# Patient Record
Sex: MALE | Race: WHITE
[De-identification: names, ages, dates, MRNs, and addresses within clinical notes are randomized per-mention and may not be internally consistent; named-entity substitution may affect disease eponyms.]

---

## 2019-07-08 ENCOUNTER — HOSPITAL ENCOUNTER (EMERGENCY)
Dept: HOSPITAL 88 - FSED | Age: 59
Discharge: HOME | End: 2019-07-08
Payer: COMMERCIAL

## 2019-07-08 VITALS — WEIGHT: 277 LBS | BODY MASS INDEX: 34.44 KG/M2 | HEIGHT: 75 IN

## 2019-07-08 VITALS — DIASTOLIC BLOOD PRESSURE: 89 MMHG | SYSTOLIC BLOOD PRESSURE: 137 MMHG

## 2019-07-08 DIAGNOSIS — M54.2: Primary | ICD-10-CM

## 2019-07-08 DIAGNOSIS — S16.1XXA: ICD-10-CM

## 2019-07-08 DIAGNOSIS — S23.3XXA: ICD-10-CM

## 2019-07-08 DIAGNOSIS — I10: ICD-10-CM

## 2019-07-08 DIAGNOSIS — M54.6: ICD-10-CM
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
## 2019-07-08 PROCEDURE — 71046 X-RAY EXAM CHEST 2 VIEWS: CPT

## 2019-07-08 PROCEDURE — 72040 X-RAY EXAM NECK SPINE 2-3 VW: CPT

## 2019-07-08 PROCEDURE — 99283 EMERGENCY DEPT VISIT LOW MDM: CPT

## 2019-07-08 NOTE — XMS REPORT
Clinical Summary

                             Created on: 2019



Rubin Anderson

External Reference #: [id number]

: 1960

Sex: Male



Demographics







                          Address                   [address]

[place]  [address]

 

                          Home Phone                [phone]

 

                          Preferred Language        English

 

                          Marital Status            

 

                          Anabaptism Affiliation     Unknown

 

                          Race                      White

 

                          Ethnic Group              Non-





Author







                          Author                    [facility]

 

                          Organization              [facility]

 

                          Address                   Unknown

 

                          Phone                     Unavailable







Support







                Name            Relationship    Address         Phone

 

                    Bella Anderson       [employer]                [address]

[place]  [address]                     [phone]







Care Team Providers







                    Care Team Member Name    Role                Phone

 

                    Asked, No Pcp       PCP                 Unavailable







Allergies







                                        Comments



                 Active Allergy     Reactions       Severity        Noted Date 

 

                                         



                           Iodine                    2017 







Medications







                          End Date                  Status



              Medication     Sig          Dispensed     Refills      Start  



                                         Date  

 

                                                    Active



                     naproxen (NAPROSYN) 250     Take 250 mg         0   



                           MG tablet                 by mouth 2     



                                         (two) times a     



                                         day with     



                                         meals.     

 

                                                    Active



                     triamcinolone (NASACORT)     2 sprays into       0   



                           55 mcg nasal inhaler      each nostril     



                                         daily.     







Active Problems





No known active problems



Social History







                                        Date



                 Tobacco Use     Types           Packs/Day       Years Used 

 

                                         



                                         Never Smoker    









   



                 Alcohol Use     Drinks/Week     oz/Week         Comments

 

   



                                         No   









 



                           Sex Assigned at Birth     Date Recorded

 

 



                                         Not on file 









                                        Industry



                           Job Start Date            Occupation 

 

                                        Not on file



                           Not on file               Not on file 









                                        Travel End



                           Travel History            Travel Start 

 





                                         No recent travel history available.







Last Filed Vital Signs

Not on file



Plan of Treatment







   



                 Health Maintenance     Due Date        Last Done       Comments

 

   



                           COLONOSCOPY SCREENING     2011  

 

   



                           SHINGLES VACCINES (#1)     2011  

 

   



                           INFLUENZA VACCINE         2019  







Results

Not on fileafter 2018



Insurance







                                        Type



            Payer      Benefit     Subscriber ID     Effective     Phone      Address 



                           Plan /                    Dates   



                                         Group     

 

                                        TPL



                 TPL             TPL-MED-DA      xxxxxxxxxx      2017-   



                           TA                        Present   

 

                                        Exchange



                 for; to (do) Centers        xxxxxxxxxxxxx     2016-P   



                     EXCHANGE            [facility]IRUDH phan   



                                         [facility]     



                                         E     









     



            Guarantor Name     Account     Relation to     Date of     Phone      Billing Address



                     Type                Patient             Birth  

 

     



            Rubin Anderson     Personal/F     Self       1960     [phone]     [address]



                     amily               (Home)              [place] [address]

 

     



            Rubin Anderson     Third      Self       1960     [phone] [address]



                     Party               (Home)              [place] [address]



                                         Liability    







Advance Directives





Patient has advance care planning documents on file. For more information, nani banerjee contact:



Bienvenido Abbasi



[address] [facility] [place] [address]

## 2019-07-08 NOTE — DIAGNOSTIC IMAGING REPORT
EXAMINATION:  CXR 2 VIEW - HOPD    



INDICATION: Trauma



COMPARISON: None

     

FINDINGS:

TUBES and LINES:  None.



LUNGS:  The lung volumes are normal. No focal consolidation or pulmonary edema.



PLEURA:  No pleural effusion or pneumothorax. 



HEART AND MEDIASTINUM: The cardiomediastinal silhouette is normal in size and

contour. Retrocardiac lucency, consistent with hiatal hernia.



BONES AND SOFT TISSUES: No acute fracture or dislocation.



UPPER ABDOMEN: No free air under the diaphragm.



IMPRESSION: 

No focal consolidation or pulmonary edema. No acute fracture or dislocation.



Hiatal hernia.



Signed by: Gonzalo Mathis MD on 7/8/2019 4:10 PM

## 2019-07-08 NOTE — XMS REPORT
Patient Summary Document

                             Created on: 2019



RUBIN ROSA

External Reference #: [id number]

: 1960

Sex: Male



Demographics







                          Address                   [address] [place] [address]  [address]

 

                          Home Phone                [phone]

 

                          Preferred Language        Unknown

 

                          Marital Status            Unknown

 

                          Pentecostalism Affiliation     Unknown

 

                          Race                      Unknown

 

                          Ethnic Group              Unknown





Author







                          Author                    [facility]

 

                          [facility]

 

                          Address                   Unknown

 

                          Phone                     Unavailable







Care Team Providers







                    Care Team Member Name    Role                Phone

 

                    VIKTORIYA CRAMER    Unavailable         Unavailable







Problems

This patient has no known problems.



Allergies, Adverse Reactions, Alerts

This patient has no known allergies or adverse reactions.



Medications

This patient has no known medications.



Results







           Test Description    Test Time    Test Comments    Text Results    Atomic Results    Result

 Comments

 

                    CT, ABDOMEN         2018 21:41:00    Reason for exam:->abdominal painWhat is the patient's

 sedation requirement?->No Sedation     FINAL REPORT PATIENT ID:   [id number] CT of the

 abdomen and pelvis History: Abdominal pain Comparison: None available.         
                                                             Technique: [facility]
idetector CT scanning of the abdomen and pelvis was performed from the level of 
the lung bases to the inferior pubic ramus without contrast. DOSE REDUCTION: The
examination was performed according to departmental dose-optimization program 
which includes automated exposure control, adjustment of the mA and/or kV 
according to patient size and/or use of iterative reconstruction technique.     
                                                            Discussion: The 
bilateral lung bases are clear. Lack of IV contrast limits evaluation of solid 
and hollow visceral organs. No focal hepatic lesions are identified. The 
gallbladder is surgically absent. There is no intrahepatic or extrahepatic 
biliary dilatation. The spleen is within normal limits of size. The right 
adrenal gland is normal. The left adrenal gland contains a left adrenal gland 
contains a 2.1 cm myelolipoma. The pancreas is within normal limits. The kidneys
are normal in size. There is no hydroureteronephrosis. No obstructing renal calc
tanika are identified. The stomach demonstrates postsurgical changes consistent 
with a Nissen fundoplication. The stomach, small, and large bowel are 
nondistended. There is no evidence of obstruction. No bowel wall thickening is 
appreciated. The appendix is normal. There are scattered colonic diverticula 
without evidence of acute diverticulitis. There is no free retroperitoneal air 
or ascites. The abdominal aorta is of normal course and caliber. No enlarged 
abdominal or retroperitoneal lymph nodes are identified. The urinary bladder is 
within normal limits. No acute osseous abnormalities are identified. 
IMPRESSION:1. Colonic diverticulosis without evidence of acute diverticulitis.2.
Status post cholecystectomy and Nissen fundoplication.3. Left adrenal 
myelolipoma. Signed: Dreyer, Stephen MDReport Verified Date/Time:  2018 
21:41:46 Reading Location: [facility] B1 C013W Consult Reading Room     Electronically 
signed by: STEPHEN EDWARD DREYER on 2018 09:41 PM      

 

                    PET, CARDIAC PERFUSION MULTIPLE STUDIES, REST AND STRESS    2018 21:20:00    Reason

 for exam:->Chest pain                  FINAL REPORT PATIENT ID:   [id number] PROCEDURE:     Rest/Stress

 MYOCARDIAL PERFUSION PET with regadenoson\XA9\ CPT CODE:     71475 INDICATION: 
   Chest pain HISTORY:     Cardiac risk factors: hypertension, obesity, family 
hx of early CAD. Other cardiovascular history: None reported. Recent cardiac 
symptoms: chest pain, dyspnea. Current cardiovascular-related medications: None 
listed. PROTOCOL:     Limited low-dose CT imaging was performed for attenuation 
correction. 40.1 mCi of Rb-82 chloride was injected iv at rest, and gated PET 
(positron emission tomography) images were obtained. Subsequently, 40.0 mCi of 
Rb-82 chloride was injected iv at expected peak pharmacologic effect, and gated 
PET images were obtained.  PRELIMINARY STRESS TEST DATA FROM NONINVASIVE 
CARDIOLOGY:     Pharmacologic stress was by 10-second iv infusion of 0.4 mg of 
regadenoson. Radiotracer was injected 30 seconds after start of stress. Heart 
rate was 66 beats/min at rest and 68 beats/min (41% of MPHR) at tracer 
injection. BP was 116/69 mmHg at rest and 129/63 mmHg at tracer injection. 
Stress was stopped for predetermined endpoint. The patient experienced dyspnea; 
treatment was not required. Preliminary ECG evaluation revealed sinus rhythm at 
rest and no ischemic changes with stress. (Final ECG interpretation and other 
stress and monitoring data are reported separately by Cardiology.)  IMAGING 
FINDINGS:     Study quality is good. Images obtained after rest and stress 
injections show normal LV activity. LV and RV volumes appear normal. Gated 
images obtained at rest and with stress show normal LV wall motion and thickenin
g. LVEF at rest is 57%. LVEF at stress is 72%. IMPRESSION:     1. Normal study. 
2. Appropriate pharmacologic stress.  3. Normal myocardial perfusion.  4. Normal
resting LV function. No deterioration of function is noted with pharmacologic 
stress.  5. Normal extracardiac tracer distribution.  6. No previous [facility] study
for comparison.   NONINVASIVE RISK STRATIFICATION: The above findings are 
considered low risk (<1% annual mortality rate) based on the following 
criterion:- Normal or small myocardial perfusion defect at rest or with 
stress(JACC. 2012;59(9):684-81.) Signed: Rubin Monzon Verified 
Date/Time:  2018 21:20:37 Reading Location: [address] 
Reading Room    Electronically signed by: RUBIN MONZON MD on 2018 
09:20 PM                                 

 

                POCT-GLUCOSE METER    2018 07:38:00                      

 

   

 

                POC-GLUCOSE METER (BEAKER) (test code=1538)    91 mg/dL                  TESTED AT [facility] [address]

 [facility] [address]





TROPONIN -43-10 23:53:00* 





                Test Item       Value           Reference Range    Comments

 

                TROPONIN I (BEAKER) (test code=397)    < ng/mL         0.00-0.03        





Troponin I (TnI) levels must be interpreted in the context of the presenting sym
ptoms and the clinical findings. Elevated TnI levels indicate myocardial damage,
but are not specific for ischemic heart disease. Elevated TnI levels are seen in
patients with other cardiac conditions (including myocarditis and congestive h
eart failure), and slight TnI elevations occur in patients with other conditions
, including sepsis, renal failure, acidosis, acute neurological disease, and per
sistent tachyarrhythmia.[id number] 23:46:00* 





                Test Item       Value           Reference Range    Comments

 

                LIPASE (BEAKER) (test code=749)    39 U/L          8-78             





[id number] 23:46:00* 





                Test Item       Value           Reference Range    Comments

 

                AMYLASE (BEAKER) (test code=349)    41 U/L                     





RAD, ABDOMEN,  2 [id number] 23:12:00Reason for exam:->abdominal painFINAL 
REPORT PATIENT ID:   [id number] Abdominal series, upright and supine views C
LINICAL HISTORY: Abdominal pain COMPARISON: None FINDINGS:The bowel gas pattern 
is nonobstructive. No free intraperitoneal air or air-fluid levels are identifie
d. The regional osseous skeleton is intact. IMPRESSION:Nonobstructive bowel gas 
pattern. Signed: Dreyer, Stephen MDReport Verified Date/Time:  2018 23:12:
35 Reading Location: [facility] B1 C013W Consult Reading Room     Electronically signed
by: STEPHEN EDWARD DREYER on 2018 11:12 PM POCT-GLUCOSE [id number] 
21:52:00* 





                Test Item       Value           Reference Range    Comments

 

                POC-GLUCOSE METER (BEAKER) (test code=1538)    96 mg/dL                  TESTED AT [facility] [address]

 [facility] [address]





RAD, CHEST, 2 [id number] 16:37:00Reason for exam:->CHEST PAINFINAL REPORT 
PATIENT ID:   [id number]  TECHNIQUE: Frontal and lateral chest radiographs dated 
2018. CLINICAL HISTORY: Chest pain COMPARISON STUDY: None FINDINGS:  
Atelectasis is seen in the left lung base. No focal consolidation. No pleural 
effusion or pneumothorax. Cardiomediastinal silhouette is normal in size. Small 
hiatal hernia is present. No pulmonary edema. Degenerative changes are seen in 
the spine. No fracture. IMPRESSION: No focal consolidation. Signed: Yessi Colbert MDReport Verified Date/Time:  2018 16:37:25 Reading Location: [facility] Reading Room    Electronically signed by: YESSI COLBERT 
on 2018 04:37 PM [id number] 16:31:00* 





                Test Item       Value           Reference Range    Comments

 

                D-DIMER QUANTITATIVE (BEAKER) (test code=671)    0.24 MG/L FEU    <0.50            





REGARDING D-DIMER RESULTS: Results of this D-Dimer test should always be interpr
eted in conjunction with the patient's medical history, clinical presentation an
d other findings. DVT clinical diagnosis should not be based on the results of I
NNOVANCE D-Dimer alone.PROTHROMBIN TIME/[id number] 16:26:00* 





                Test Item       Value           Reference Range    Comments

 

                PROTIME (BEAKER) (test code=759)    9.8 seconds     9.8-12.0         

 

                INR (BEAKER) (test code=370)    0.9             <=5.9            





RECOMMENDED COUMADIN/WARFARIN INR THERAPY RANGESSTANDARD DOSE: 2.0 - 3.0   Inclu
diego: PROPHYLAXIS for venous thrombosis, systemic embolization; TREATMENT for chilo
ous thrombosis and/or pulmonary embolus.HIGH RISK: Target INR is 2.5-3.5 for pat
ients with mechanical heart valves.RAPID [id number] 16:25:00* 





                Test Item       Value           Reference Range    Comments

 

                RAPID MYOGLOBIN (BEAKER) (test code=2237)    56 ng/mL        <107             





RAPID [id number] 16:25:00* 





                Test Item       Value           Reference Range    Comments

 

                RAPID CKMB (BEAKER) (test code=1482)    < ng/mL         0.0-4.3          





B-TYPE NATRIURETIC FACTOR (BNP)2018 16:25:00* 





                Test Item       Value           Reference Range    Comments

 

                B-TYPE NATRIURETIC PEPTIDE (BEAKER) (test code=700)    8 pg/mL         0-100            





RAPID TROPONIN -88-53 16:24:00* 





                Test Item       Value           Reference Range    Comments

 

                RAPID TROPONIN I (BEAKER) (test code=1483)    < ng/mL         <0.05            





[id number] 16:23:00* 





                Test Item       Value           Reference Range    Comments

 

                MAGNESIUM (BEAKER) (test code=627)    2.1 mg/dL       1.5-3.0          





COMPREHENSIVE METABOLIC [id number] 16:23:00* 





                Test Item       Value           Reference Range    Comments

 

                TOTAL PROTEIN (BEAKER) (test code=770)    6.9 gm/dL       6.0-8.5          

 

                ALBUMIN (BEAKER) (test code=1145)    4.2 g/dL        3.5-5.0          

 

                ALKALINE PHOSPHATASE (BEAKER) (test code=346)    80 U/L                     

 

                BILIRUBIN TOTAL (BEAKER) (test code=377)    0.5 mg/dL       0.1-1.2          

 

                SODIUM (BEAKER) (test code=381)    144 meq/L       135-148          

 

                POTASSIUM (BEAKER) (test code=379)    3.9 meq/L       3.6-5.5          

 

                CHLORIDE (BEAKER) (test code=382)    107 meq/L                  

 

                CO2 (BEAKER) (test code=355)    24 meq/L        24-32            

 

                BLOOD UREA NITROGEN (BEAKER) (test code=354)    15 mg/dL        10-26            

 

                CREATININE (BEAKER) (test code=358)    0.97 mg/dL      0.50-1.20        

 

                GLUCOSE RANDOM (BEAKER) (test code=652)    85 mg/dL                   

 

                CALCIUM (BEAKER) (test code=697)    8.7 mg/dL       8.5-10.5         

 

                AST (SGOT) (BEAKER) (test code=353)    24 U/L          5-40             

 

                ALT (SGPT) (BEAKER) (test code=347)    30 U/L          5-50             

 

                EGFR (BEAKER) (test code=1092)    80 mL/min/1.73 sq m                    ESTIMATED GFR IS NOT AS 

ACCURATE AS CREATININE CLEARANCE IN PREDICTING GLOMERULAR FILTRATION RATE. 
ESTIMATED GFR IS NOT APPLICABLE FOR DIALYSIS PATIENTS.





CREATINE KINASE (CK)2018 16:23:00* 





                Test Item       Value           Reference Range    Comments

 

                CREATINE KINASE TOTAL (BEAKER) (test code=380)    28 U/L                     





CBC W/PLT COUNT & AUTO [id number] 16:11:00* 





                Test Item       Value           Reference Range    Comments

 

                WHITE BLOOD CELL COUNT (BEAKER) (test code=775)    6.2 10e3/i? L    4.0-10.0         

 

                RED BLOOD CELL COUNT (BEAKER) (test code=761)    4.69 10e6/i? L    4.20-5.80        

 

                HEMOGLOBIN (BEAKER) (test code=410)    14.1 g/dL       13.0-16.8        

 

                HEMATOCRIT (BEAKER) (test code=411)    41.8 %          40.0-50.0        

 

                MEAN CORPUSCULAR VOLUME (BEAKER) (test code=753)    89.2 fL         82.0-98.0        

 

                MEAN CORPUSCULAR HEMOGLOBIN (BEAKER) (test code=751)    30.1 pg         27.0-33.0        

 

                    MEAN CORPUSCULAR HEMOGLOBIN CONC (BEAKER) (test code=752)    33.7 g/dL           32.0-36.0 

                                         

 

                RED CELL DISTRIBUTION WIDTH (BEAKER) (test code=412)    12.8 %          10.3-14.2        

 

                PLATELET COUNT (BEAKER) (test code=756)    194 10e3/i? L    150-430          

 

                MEAN PLATELET VOLUME (BEAKER) (test code=754)    7.2 fL          6.5-10.5         

 

                NEUTROPHILS RELATIVE PERCENT (BEAKER) (test code=429)    52 %                             

 

                LYMPHOCYTES RELATIVE PERCENT (BEAKER) (test code=430)    34 %                             

 

                MONOCYTES RELATIVE PERCENT (BEAKER) (test code=431)    10 %                             

 

                EOSINOPHILS RELATIVE PERCENT (BEAKER) (test code=432)    3 %                              

 

                BASOPHILS RELATIVE PERCENT (BEAKER) (test code=437)    1 %                              

 

                NEUTROPHILS ABSOLUTE COUNT (BEAKER) (test code=670)    3.23 10e3/i? L    1.80-8.00       

 

 

                LYMPHOCYTES ABSOLUTE COUNT (BEAKER) (test code=414)    2.08 10e3/i? L    1.48-4.50       

 

 

                MONOCYTES ABSOLUTE COUNT (BEAKER) (test code=415)    0.59 10e3/i? L    0.00-1.30        

 

                EOSINOPHILS ABSOLUTE COUNT (BEAKER) (test code=416)    0.20 10e3/i? L    0.00-0.50       

 

 

                BASOPHILS ABSOLUTE COUNT (BEAKER) (test code=417)    0.06 10e3/i? L    0.00-0.20

## 2019-07-08 NOTE — DIAGNOSTIC IMAGING REPORT
EXAM: Cervical spine, 4 views



DATE: 7/8/2019

INDICATION: Trauma

COMPARISON: None



FINDINGS:



No acute fracture. The alignment is within normal limits. Mild multilevel

degenerative changes of the cervical spine. The soft tissues appear

unremarkable.



IMPRESSION:

No acute osseous injury of the cervical spine.



Signed by: Gonzalo Mathis MD on 7/8/2019 4:14 PM

## 2019-07-08 NOTE — XMS REPORT
Clinical Summary

                             Created on: 2019



Rubin Anderson

External Reference #: [id number]

: 1960

Sex: Male



Demographics







                          Address                   [id number] [place] [address]  [address]

 

                          Home Phone                [phone]

 

                          Preferred Language        English

 

                          Marital Status            Unknown

 

                          Confucianist Affiliation     Pentecostalism

 

                          Race                      White

 

                          Ethnic Group              Non-





Author







                          Author                    [facility]St[facility]

 

                          Organization              [facility]

 

                          Address                   Unknown

 

                          Phone                     Unavailable







Support







                Name            Relationship    Address         Phone

 

                Bella Anderson    [employer]            Unknown         [phone]







Care Team Providers







                    Care Team Member Name    Role                Phone

 

                    Sharpless           PCP                 [phone]







Allergies







                                        Comments



                 Active Allergy     Reactions       Severity        Noted Date 

 

                                         



                     Iodinated Contrast- Oral     Swelling            2017 



                                         And Iv Dye    







Medications







                          End Date                  Status



              Medication     Sig          Dispensed     Refills      Start  



                                         Date  

 

                                                    Active



                     albuterol HFA (VENTOLIN     Inhale 2            0   



                           HFA) 90 mcg/actuation     puffs by     



                           inhaler                   mouth via     



                                         inhaler every     



                                         6 (six) hours     



                                         as needed for     



                                         Wheezing or     



                                         Shortness of     



                                         Breath.     

 

                                                    Active



                     amitriptyline (ELAVIL) 10     Take 10 mg by       0   



                           MG tablet                 mouth     



                                         nightly.     

 

                                                    Active



                     fluticasone (FLONASE) 50     2 sprays by         0   



                           mcg/actuation nasal spray     Nasal route     



                                         daily.     

 

                                                    Active



                     fluticasone-salmeterol     Inhale 1 puff       0   



                           (ADVAIR) 250-50 mcg/dose     by mouth via     



                           diskus inhaler            inhaler every     



                                         12 (twelve)     



                                         hours.     

 

                                                    Active



                     gabapentin (NEURONTIN)     Take 300 mg         0   



                           300 MG capsule            by mouth 3     



                                         (three) times     



                                         daily.     

 

                                                    Active



                     lisinopril          Take 10 mg by       0   



                           (PRINIVIL,ZESTRIL) 10 MG     mouth daily.     



                                         tablet      

 

                                                    Active



                     montelukast (SINGULAIR)     Take 10 mg by       0   



                           10 mg tablet              mouth     



                                         nightly.     

 

                                                    Active



                     omeprazole (PRILOSEC) 40     Take 40 mg by       0   



                           MG capsule                mouth 2 (two)     



                                         times daily.     

 

                                                    Active



                     ranitidine (ZANTAC) 300     Take 300 mg         0   



                           MG tablet                 by mouth     



                                         nightly.     

 

                                                    Active



                     traMADol (ULTRAM) 50 mg     Take 50 mg by       0   



                           tablet                    mouth every 6     



                                         (six) hours     



                                         as needed for     



                                         Pain.     

 

                          2018                Discontinued



                     lisinopril          Take 10 mg by       0   



                           (PRINIVIL,ZESTRIL) 10 MG     mouth daily.     



                                         tablet      

 

                          2018                Discontinued



                     naproxen (NAPROSYN) 500     Take 500 mg         0   



                           MG tablet                 by mouth as     



                                         needed.     

 

                          2018                Discontinued



                     cetirizine (ZYRTEC) 5 MG     Take 5 mg by        0   



                           tablet                    mouth daily.     

 

                          2018                Discontinued



                 amitriptyline (ELAVIL) 10     TAKE ONE        0                 



                     MG tablet           TABLET BY           8  



                                         MOUTH AT     



                                         BEDTIME     

 

                          2018                Discontinued



                 gabapentin (NEURONTIN)     TAKE 2          0                 



                     300 MG capsule      CAPSULES BY         8  



                                         MOUTH AT NOON     

 

                          2018                Discontinued



                     triamcinolone (NASACORT)     Nasacort            0   



                                         55 mcg nasal inhaler      

 

                          2018                Discontinued



                 fluticasone (FLONASE) 50     2 sprays.       0                 



                           mcg/actuation nasal spray        8  

 

                          2018                Discontinued



                 albuterol HFA (VENTOLIN     Inhale 2        0                 



                     HFA) 90 mcg/actuation     puffs by            8  



                           inhaler                   mouth via     



                                         inhaler every     



                                         6 (six) hours     



                                         as needed for     



                                         Wheezing or     



                                         Shortness of     



                                         Breath .     

 

                          2018                Discontinued



                     ranitidine (ZANTAC) 300     Take 300 mg         0   



                           MG tablet                 by mouth     



                                         nightly.     

 

                          2018                Discontinued



                     omeprazole (PRILOSEC) 40     Take 40 mg by       0   



                           MG capsule                mouth 2 (two)     



                                         times daily.     







Active Problems







 



                           Problem                   Noted Date

 

 



                           Acute chest pain          2018

 

 



                           Gastroesophageal reflux disease without esophagitis     2018

 

 



                           History of hiatal hernia     2018

 

 



                           Essential hypertension     2018

 

 



                           WAI on CPAP               2018

 

 



                           Obesity (BMI 30.0-34.9)     2018

 

 



                           Moderate intermittent asthma without complication     2018

 

 



                           Right sided abdominal pain     2018

 

 



                           History of cholecystectomy     2018

 

 



                           History of Nissen fundoplication 2014     2018

 

 



                           Chronic back pain greater than 3 months duration     2018







Resolved Problems







  



                     Problem             Noted Date          Resolved Date

 

  



                     Chest pain in adult     2018

 

  



                     Shortness of breath     2018

 

  



                     Dizziness           2018

 

  



                     Nausea              2018

 

  



                     Inguinal hernia     10/04/2017          2018







Encounters







                          Care Team                 Description



                     Date                Type                Specialty  

 

                                        



Tiara Hurley MD



MerlyIam randle MD                     Chest pain in adult (Primary Dx); 

Nausea; 

Shortness of breath; 

Dizziness



                     2018          Emergency           Cardiology  



                                         -    



                                         2018          Emergency           Emergency Medicine  

 

                                                     



                     2018          Orders Only         General Internal Medicine  



after 2018



Immunizations







  



                     Name                Dates Previously Given     Next Due

 

  



                           Tdap                      2011 







Family History







   



                 Medical History     Relation        Name            Comments

 

   



                           Heart disease             Father  

 

   



                           Heart disease             Maternal  



                                         Grandfather  

 

   



                           Cancer                    Maternal  



                                         Grandmother  

 

   



                           Heart disease             Mother  

 

   



                           Cancer                    Sister  









   



                 Relation        Name            Status          Comments

 

   



                                         Father   

 

   



                                         Maternal Grandfather   

 

   



                                         Maternal Grandmother   

 

   



                                         Mother   

 

   



                                         Sister   







Social History







                                        Date



                 Tobacco Use     Types           Packs/Day       Years Used 

 

                                         



                                         Never Smoker    

 

    



                                         Smokeless Tobacco: Never   



                                         Used   









   



                 Alcohol Use     Drinks/Week     oz/Week         Comments

 

   



                                         No   









 



                           Sex Assigned at Birth     Date Recorded

 

 



                                         Not on file 









                                        Industry



                           Job Start Date            Occupation 

 

                                        Not on file



                           Not on file               Not on file 









                                        Travel End



                           Travel History            Travel Start 

 





                                         No recent travel history available.







Last Filed Vital Signs







                                        Time Taken



                           Vital Sign                Reading 

 

                                        2018  5:51 AM CDT



                           Blood Pressure            120/67 

 

                                        2018  9:16 AM CDT



                           Pulse                     69 

 

                                        2018  5:51 AM CDT



                           Temperature               36.5 C (97.7 F) 

 

                                        2018  9:16 AM CDT



                           Respiratory Rate          18 

 

                                        2018  9:16 AM CDT



                           Oxygen Saturation         99% 

 

                                        -



                           Inhaled Oxygen            - 



                                         Concentration  

 

                                        2018  7:15 AM CDT



                           Weight                    118.5 kg (261 lb 4.8 oz) 

 

                                        -



                           Height                    - 

 

                                        2018  7:15 AM CDT



                           Body Mass Index           32.66 







Plan of Treatment





Not on file



Implants







                    Device Identifier    Shelf Expiration Date    Model / Serial / Lot



                 Implanted       Type            Area            Manufactur   



                                         er   

 

                                        10/28/2021          [id number] /

 /

[id number]



                 Mesh Kvng Pre-Shp 4.5x10cm [id number]     Mesh            Right: Inguinal     CR   



                           - [id number]               BARD:DAVOL   



                                         Implanted: Qty: 1 on 10/04/2017 by      



                                         Raheem Harper MD      







Procedures







                                        Comments



                 Procedure Name     Priority        Date/Time       Associated Diagnosis 

 

                                         



                           RHYTHM STRIP - SCAN       2018  



                                         7:51 AM CDT  

 

                                        



Results for this procedure are in the results section.



                     ED ECG INTERPRETATION     Routine             2018  



                                         3:39 PM CDT  

 

                                        



Results for this procedure are in the results section.



                     CT ABDOMEN/PELVIS WITHOUT     STAT                2018  



                           IV CONTRAST               9:30 PM CDT  

 

                                        



Results for this procedure are in the results section.



                     NM CARDIAC PET PERFUSION     Routine             2018  



                           REST AND/OR STRESS        4:23 PM CDT  

 

                                        



Results for this procedure are in the results section.



                     TREADMILL           Routine             2018  



                           TOLERANCE(NON-NUCLEAR      4:04 PM CDT  



                                         TREADMILL)    

 

                                         



                           RHYTHM STRIP - SCAN       2018  



                                         10:52 AM CDT  

 

                                        



Results for this procedure are in the results section.



                     POCT-GLUCOSE METER     Routine             2018  



                                         7:07 AM CDT  

 

                                        



Results for this procedure are in the results section.



                     AMYLASE             ASAP                2018  



                                         11:20 PM CDT  

 

                                        



Results for this procedure are in the results section.



                     LIPASE              ASAP                2018  



                                         11:20 PM CDT  

 

                                        



Results for this procedure are in the results section.



                     TROPONIN I          ASAP                2018  



                                         11:20 PM CDT  

 

                                        



Results for this procedure are in the results section.



                     XR ABDOMEN 2 VIEWS FLAT     STAT                2018  



                           AND UPRIGHT               11:08 PM CDT  

 

                                        



Results for this procedure are in the results section.



                     POCT-GLUCOSE METER     Routine             2018  



                                         9:51 PM CDT  

 

                                        



Results for this procedure are in the results section.



                     XR CHEST 2 VIEWS     STAT                2018  



                                         4:27 PM CDT  

 

                                        



Results for this procedure are in the results section.



                     D-DIMER             STAT                2018  



                                         4:08 PM CDT  

 

                                        



Results for this procedure are in the results section.



                     RAPID MYOGLOBIN     STAT                2018  



                                         4:00 PM CDT  

 

                                        



Results for this procedure are in the results section.



                     CBC W/PLT COUNT & AUTO     STAT                2018  



                           DIFFERENTIAL              3:59 PM CDT  

 

                                        



Results for this procedure are in the results section.



                     B-TYPE NATRIURETIC FACTOR     STAT                2018  



                           (BNP)                     3:59 PM CDT  

 

                                        



Results for this procedure are in the results section.



                     CREATINE KINASE (CK)     STAT                2018  



                                         3:59 PM CDT  

 

                                        



Results for this procedure are in the results section.



                     RAPID TROPONIN I     STAT                2018  



                                         3:59 PM CDT  

 

                                        



Results for this procedure are in the results section.



                     RAPID CK-MB         STAT                2018  



                                         3:59 PM CDT  

 

                                        



Results for this procedure are in the results section.



                     PROTHROMBIN TIME/INR     STAT                2018  



                                         3:59 PM CDT  

 

                                        



Results for this procedure are in the results section.



                     MAGNESIUM           STAT                2018  



                                         3:59 PM CDT  

 

                                        



Results for this procedure are in the results section.



                     COMPREHENSIVE METABOLIC     STAT                2018  



                           PANEL                     3:59 PM CDT  

 

                                        



Results for this procedure are in the results section.



                     CBC W/PLT COUNT & AUTO     STAT                2018  



                           DIFFERENTIAL              3:59 PM CDT  

 

                                        



Results for this procedure are in the results section.



                     ECG 12-LEAD         STAT                2018  



                                         3:48 PM CDT  



after 2018



Results

* RHYTHM STRIP - SCAN (2018  7:51 AM CDT)



Only the most recent of 2 results within the time period is included.





 



                           Narrative                 Performed At

 

 



                                         This result has an attachment that is not available. 





* ED ECG Interpretation (2018  3:39 PM CDT)





 



                           Narrative                 Performed At

 

 



                                         Tiara Hurley MD 20183:39 PM 



                                         ECG/EKG Interpretation 



                                         Date/Time: 2018 3:48 PM 



                                         Performed by: TIARA HURLEY 



                                         Authorized by: TIARA HURLEY 



                                         The ECG was interpreted by ED physician. This ECG was not compared with 



                                         previous ECG(s).The ECG is interpreted as sinus rhythm. Rate is normal 



                                         rate. Heart rate is 83 BPM. 



                                         Conduction: conduction normal. T-wave flattening in lead(s) III and aVF. 



                                         Other findings: no other findings. Right sided lead use: right-sided leads 



                                         not used. 



                                         Left sided lead use: Posterior leads were not used. Clinical Impression: 



                                         non-specific ECGECG reviewed and does not meet STEMI criteria. Patient 



                                         tolerance: Patient tolerated the procedure well with no immediate 



                                         complications 





* CT abdomen/pelvis without iv contrast (2018  9:30 PM CDT)









                                         Specimen

 











 



                           Narrative                 Performed At

 

 



                           FINAL REPORT              [facility]



                                         PATIENT ID: [id number] 



                                         CT of the abdomen and pelvis 



                                         History: Abdominal pain 



                                         Comparison: None available. 



                                         

 



                                          



                                         Technique: Multidetector CT scanning of the abdomen and pelvis was 



                                         performed from the level of the lung bases to the inferior pubic 



                                         ramus without contrast. 



                                         DOSE REDUCTION: The examination was performed according to 



                                         departmental dose-optimization program which includes automated 



                                         exposure control, adjustment of the mA and/or kV according to patient 



                                         size and/or use of iterative reconstruction 



                                         technique.

 



                                          



                                         Discussion: 



                                         The bilateral lung bases are clear. 



                                         Lack of IV contrast limits evaluation of solid and hollow visceral 



                                         organs. 



                                         No focal hepatic lesions are identified. The gallbladder is 



                                         surgically absent. There is no intrahepatic or extrahepatic biliary 



                                         dilatation. 



                                         The spleen is within normal limits of size. 



                                         The right adrenal gland is normal. The left adrenal gland contains a 



                                         left adrenal gland contains a 2.1 cm myelolipoma. 



                                         The pancreas is within normal limits. 



                                         The kidneys are normal in size. There is no hydroureteronephrosis. 



                                         No obstructing renal calculi are identified. 



                                         The stomach demonstrates postsurgical changes consistent with a 



                                         Nissen fundoplication. The stomach, small, and large bowel are 



                                         nondistended. There is no evidence of obstruction. No bowel wall 



                                         thickening is appreciated. The appendix is normal. There are 



                                         scattered colonic diverticula without evidence of acute 



                                         diverticulitis. 



                                         There is no free retroperitoneal air or ascites. 



                                         The abdominal aorta is of normal course and caliber. 



                                         No enlarged abdominal or retroperitoneal lymph nodes are identified. 



                                         The urinary bladder is within normal limits. 



                                         No acute osseous abnormalities are identified. 



                                         IMPRESSION: 



                                         1. Colonic diverticulosis without evidence of acute diverticulitis. 



                                         2. Status post cholecystectomy and Nissen fundoplication. 



                                         3. Left adrenal myelolipoma. 



                                         Signed: Dreyer, Stephen MD 



                                         Report Verified Date/Time:2018 21:41:46 



                                         Reading Location: [facility] C013W Consult Reading Room 



                                          Electronically signed by: STEPHEN EDWARD DREYER on 2018 09:41 PM

 









                                        Procedure Note

 

                                        



Interface, External Ris In - 2018  9:43 PM CDT



FINAL REPORT

 

PATIENT ID:   [id number]

 

CT of the abdomen and pelvis

 

History: Abdominal pain

 

Comparison: None available.

                                                                       



Technique: Multidetector CT scanning of the abdomen and pelvis was 

performed from the level of the lung bases to the inferior pubic 

ramus without contrast.

 

DOSE REDUCTION: The examination was performed according to 

departmental dose-optimization program which includes automated 

exposure control, adjustment of the mA and/or kV according to patient 

size and/or use of iterative reconstruction technique.                          
                                     



 

 

Discussion: 

The bilateral lung bases are clear.

 

Lack of IV contrast limits evaluation of solid and hollow visceral 

organs.

 

No focal hepatic lesions are identified. The gallbladder is 

surgically absent. There is no intrahepatic or extrahepatic biliary 

dilatation.

 

The spleen is within normal limits of size.

 

The right adrenal gland is normal. The left adrenal gland contains a 

left adrenal gland contains a 2.1 cm myelolipoma.

 

The pancreas is within normal limits.

 

The kidneys are normal in size. There is no hydroureteronephrosis.

 

No obstructing renal calculi are identified.

 

The stomach demonstrates postsurgical changes consistent with a 

Nissen fundoplication. The stomach, small, and large bowel are 

nondistended. There is no evidence of obstruction. No bowel wall 

thickening is appreciated. The appendix is normal. There are 

scattered colonic diverticula without evidence of acute 

diverticulitis.

 

There is no free retroperitoneal air or ascites.

 

The abdominal aorta is of normal course and caliber.

 

No enlarged abdominal or retroperitoneal lymph nodes are identified.

 

The urinary bladder is within normal limits.

 

No acute osseous abnormalities are identified.

 

IMPRESSION:

                                        1. Colonic diverticulosis without evidence of acute diverticulitis.

                                        2. Status post cholecystectomy and Nissen fundoplication.

                                        3. Left adrenal myelolipoma.

 

Signed: Dreyer, Stephen MD

Report Verified Date/Time:  2018 21:41:46

 

Reading Location: [facility] B1 C013W Consult Reading Room

     Electronically signed by: STEPHEN EDWARD DREYER on 2018 09:41 PM

 









   



                 Performing Organization     Address         City/State/Zipcode     Phone Number

 

   



                                         [employer]   





* NM myocardial perfusion PET (rest and stress) (2018  4:23 PM CDT)









                                         Specimen

 











 



                           Narrative                 Performed At

 

 



                           FINAL REPORT              [employer]



                                         PATIENT ID: [id number] 



                                         PROCEDURE: Rest/Stress MYOCARDIAL PERFUSION PET with 



                                         regadenoson\XA9\ 



                                         CPT CODE: 20829 



                                         INDICATION: Chest pain 



                                         HISTORY: Cardiac risk factors: hypertension, obesity, family hx 



                                         of early CAD. Other cardiovascular history: None reported. Recent 



                                         cardiac symptoms: chest pain, dyspnea. Current cardiovascular-related 



                                         medications: None listed. 



                                         PROTOCOL: Limited low-dose CT imaging was performed for 



                                         attenuation correction. 40.1 mCi of Rb-82 chloride was injected iv at 



                                         rest, and gated PET (positron emission tomography) images were 



                                         obtained. Subsequently, 40.0 mCi of Rb-82 chloride was injected iv at 



                                         expected peak pharmacologic effect, and gated PET images were 



                                         obtained. 



                                         PRELIMINARY STRESS TEST DATA FROM NONINVASIVE CARDIOLOGY: 



                                         Pharmacologic stress was by 10-second iv infusion of 0.4 mg of 



                                         regadenoson. Radiotracer was injected 30 seconds after start of 



                                         stress. Heart rate was 66 beats/min at rest and 68 beats/min (41% of 



                                         MPHR) at tracer injection. BP was 116/69 mmHg at rest and 129/63 mmHg 



                                         at tracer injection. Stress was stopped for predetermined endpoint. 



                                         The patient experienced dyspnea; treatment was not required. 



                                         Preliminary ECG evaluation revealed sinus rhythm at rest and no 



                                         ischemic changes with stress. (Final ECG interpretation and other 



                                         stress and monitoring data are reported separately by Cardiology.) 



                                         IMAGING FINDINGS: Study quality is good. Images obtained after 



                                         rest and stress injections show normal LV activity. LV and RV volumes 



                                         appear normal. Gated images obtained at rest and with stress show 



                                         normal LV wall motion and thickening. LVEF at rest is 57%. LVEF at 



                                         stress is 72%. 



                                         IMPRESSION: 1. Normal study.2. Appropriate pharmacologic 



                                         stress.3. Normal myocardial perfusion.4. Normal resting LV 



                                         function. No deterioration of function is noted with pharmacologic 



                                         stress.5. Normal extracardiac tracer distribution.6. No previous 



                                         [facility] study for comparison. 



                                         NONINVASIVE RISK STRATIFICATION: 



                                         The above findings are considered low risk (<1% annual mortality 



                                         rate) based on the following criterion: 



                                         - Normal or small myocardial perfusion defect at rest or with stress 



                                         ([facility]. 2012;59(9):857-81.) 



                                         Signed: Rubin Monzon MD 



                                         Report Verified Date/Time:2018 21:20:37 



                                         Reading Location: [address] Reading Room 



                                         Electronically signed by: RUBIN MONZON MD on 2018 09:20 PM

 









                                        Procedure Note

 

                                        



Interface, External Ris In - 2018  9:22 PM CDT



FINAL REPORT

 

PATIENT ID:   [id number]

 

PROCEDURE:     Rest/Stress MYOCARDIAL PERFUSION PET with 

regadenoson\XA9\

 

CPT CODE:     17821

 

INDICATION:     Chest pain

 

HISTORY:     Cardiac risk factors: hypertension, obesity, family hx 

of early CAD. Other cardiovascular history: None reported. Recent 

cardiac symptoms: chest pain, dyspnea. Current cardiovascular-related 

medications: None listed.

 

PROTOCOL:     Limited low-dose CT imaging was performed for 

attenuation correction. 40.1 mCi of Rb-82 chloride was injected iv at 

rest, and gated PET (positron emission tomography) images were 

obtained. Subsequently, 40.0 mCi of Rb-82 chloride was injected iv at 

expected peak pharmacologic effect, and gated PET images were 

obtained. 

 

PRELIMINARY STRESS TEST DATA FROM NONINVASIVE CARDIOLOGY:     

Pharmacologic stress was by 10-second iv infusion of 0.4 mg of 

regadenoson. Radiotracer was injected 30 seconds after start of 

stress. Heart rate was 66 beats/min at rest and 68 beats/min (41% of 

MPHR) at tracer injection. BP was 116/69 mmHg at rest and 129/63 mmHg 

at tracer injection. Stress was stopped for predetermined endpoint. 

The patient experienced dyspnea; treatment was not required. 

Preliminary ECG evaluation revealed sinus rhythm at rest and no 

ischemic changes with stress. (Final ECG interpretation and other 

stress and monitoring data are reported separately by Cardiology.) 

 

IMAGING FINDINGS:     Study quality is good. Images obtained after 

rest and stress injections show normal LV activity. LV and RV volumes 

appear normal. Gated images obtained at rest and with stress show 

normal LV wall motion and thickening. LVEF at rest is 57%. LVEF at 

stress is 72%.

 

IMPRESSION:     1. Normal study.  2. Appropriate pharmacologic 

stress.  3. Normal myocardial perfusion.  4. Normal resting LV 

function. No deterioration of function is noted with pharmacologic 

stress.  5. Normal extracardiac tracer distribution.  6. No previous 

[facility] study for comparison.  

 

NONINVASIVE RISK STRATIFICATION: 

The above findings are considered low risk (<1% annual mortality 

rate) based on the following criterion:

                                        - Normal or small myocardial perfusion defect at rest or with stress

                                        ([employer]. 2012;59(9):857-81.)

 

Signed: Rubin Monzon MD

Report Verified Date/Time:  2018 21:20:37

 

Reading Location: [place] [id number] Reading Room

    Electronically signed by: RUBIN MONZON MD on 2018 09:20 PM

 









   



                 Performing Organization     Address         City/State/Zipcode     Phone Number

 

   



                                         [facility]   





* Treadmill tolerance(Non-Nuclear Treadmill) (2018  4:04 PM CDT)









                                         Specimen

 











 



                           Narrative                 Performed At

 

 



                           Protocol Name Regadenoson     [facility]



                                         Time In Exercise Phase 00:01:00 



                                         Max. Systolic  mmHg 



                                         Max Diastolic BP 63 mmHg 



                                         Max Heart Rate 68 BPM 



                                         Max Predicted Heart Rate 164 BPM 



                                         Reason For Termination Predetermined end point 



                                         Reason for Test Chest Pain 



                                         Target HR Formula (220 - Age)*100% 



                                         Arrhythmias ventricular premature beats-isolated 



                                         Resting ECG Normal sinus rhythm 



                                         ST Changes No Significant Changes 



                                         Overall Impression Indeterminate due to pharmacological stress 



                                         Chest Pain none 



                                         HR Response To Exercise 



                                         BP Response To Exercise 



                                         No cardiac Rx 



                                         Confirmed by fellow McArdle, Michael ([id number]) on 7/15/2018 6:27:21 PM 



                                         Confirmed by MD WOOD YOCHAI ([id number]) on 7/15/2018 8:07:52 PM 









                                        Procedure Note

 

                                        



Interface, External Ris In - 07/15/2018  8:08 PM CDT



Protocol Name Regadenoson 

Time In Exercise Phase 00:01:00 

Max. Systolic  mmHg

Max Diastolic BP 63 mmHg

Max Heart Rate 68 BPM

Max Predicted Heart Rate 164 BPM

Reason For Termination Predetermined end point 

Reason for Test Chest Pain 

Target HR Formula (220 - Age)*100% 

Arrhythmias ventricular premature beats-isolated 

Resting ECG Normal sinus rhythm 

ST Changes No Significant Changes 

Overall Impression Indeterminate due to pharmacological stress 

Chest Pain none 

HR Response To Exercise  

BP Response To Exercise  



No cardiac Rx



Confirmed by fellow McArdle, Michael ([id number]) on 7/15/2018 6:27:21 PM

Confirmed by MD WOOD YOCHAI ([id number]) on 7/15/2018 8:07:52 PM









   



                 Performing Organization     Address         City/[facility]     Phone Number

 

   



                                         [facility]   





* POC-Glucose meter (2018  7:07 AM CDT)



Only the most recent of 2 results within the time period is included.





   



                 Component       Value           Ref Range       Performed At

 

   



                 POC-Glucose Meter     91Comment: TESTED AT [facility]     70 - 110 mg/dL     [address]













                                         Specimen

 





                                         Blood









   



                 Performing Organization     Address         City/State/[facility]     Phone Number

 

   



                 [address]   





* Troponin I (2018 11:20 PM CDT)





   



                 Component       Value           Ref Range       Performed At

 

   



                 Troponin I      <0.01           0.00 - 0.03 ng/mL     [facility]













                                         Specimen

 





                                         Blood









 



                           Narrative                 Performed At

 

 



                           Troponin I (TnI) levels must be interpreted in the context of the presenting     

[facility]



                           symptoms and the clinical findings. Elevated TnI levels indicate myocardial     [facility]



                                         damage, but are not specific for ischemic heart disease. Elevated TnI levels are

 



                                         seen in patients with other cardiac conditions (including myocarditis and 



                                         congestive heart failure), and slight TnI elevations occur in patients with 



                                         other conditions, including sepsis, renal failure, acidosis, acute neurological

 



                                         disease, and persistent tachyarrhythmia. 









   



                 Performing Organization     Address         City/[facility] Cent[facility]     Phone Number

 

   



                 [place] [address]     [phone] [place] [address]   





* Lipase (2018 11:20 PM CDT)





   



                 Component       Value           Ref Range       Performed At

 

   



                 Lipase          39              8 - 78 U/L      [facility]













                                         Specimen

 





                                         Blood









   



                 Performing Organization     Address         City/[facility]     Phone Number

 

   



                 [place] [address]     [phone]





                                         MEDICAL CENTER   





* Amylase (2018 11:20 PM CDT)





   



                 Component       Value           Ref Range       Performed At

 

   



                 Amylase         41              25 - 125 U/L     [facility]













                                         Specimen

 





                                         Blood









   



                 Performing Organization     Address         City/[facility]/Zipcode     Phone Number

 

   



                 [facility]     [address] [place] [address] [phone]





                                         MEDICAL CENTER   





* XR abdomen 2 views flat and upright (2018 11:08 PM CDT)









                                         Specimen

 











 



                           Narrative                 Performed At

 

 



                           FINAL REPORT              [facility]



                                         PATIENT ID: [id number] 



                                         Abdominal series, upright and supine views 



                                         CLINICAL HISTORY: Abdominal pain 



                                         COMPARISON: None 



                                         FINDINGS: 



                                         The bowel gas pattern is nonobstructive. No free intraperitoneal air 



                                         or air-fluid levels are identified. The regional osseous skeleton is 



                                         intact. 



                                         IMPRESSION: 



                                         Nonobstructive bowel gas pattern. 



                                         Signed: Dreyer, Stephen MD 



                                         Report Verified Date/Time:2018 23:12:35 



                                         Reading Location: [address] Consult Reading Room 



                                          Electronically signed by: STEPHEN EDWARD DREYER on 2018 11:12 PM

 









                                        Procedure Note

 

                                        



Interface, External Ris In - 2018 11:14 PM CDT



FINAL REPORT

 

PATIENT ID:   [id number]

 

Abdominal series, upright and supine views

 

CLINICAL HISTORY: Abdominal pain

 

COMPARISON: None

 

FINDINGS:

The bowel gas pattern is nonobstructive. No free intraperitoneal air 

or air-fluid levels are identified. The regional osseous skeleton is 

intact.

 

IMPRESSION:

Nonobstructive bowel gas pattern.

 

Signed: Dreyer, Stephen MD

Report Verified Date/Time:  2018 23:12:35

 

Reading Location: [address] Consult Reading Room

     Electronically signed by: STEPHEN EDWARD DREYER on 2018 11:12 PM

 









   



                 Performing Organization     Address         City/[facility]     Phone Number

 

   



                                         [facility]   





* XR chest 2 views (2018  4:27 PM CDT)









                                         Specimen

 











 



                           Narrative                 Performed At

 

 



                           FINAL REPORT               [facility]



                                         PATIENT ID: [id number] 



                                         TECHNIQUE: Frontal and lateral chest radiographs dated 2018. 



                                         CLINICAL HISTORY: Chest pain 



                                         COMPARISON STUDY: None 



                                         FINDINGS: 



                                         Atelectasis is seen in the left lung base. No focal consolidation. No 



                                         pleural effusion or pneumothorax. Cardiomediastinal silhouette is 



                                         normal in size. Small hiatal hernia is present. No pulmonary edema. 



                                         Degenerative changes are seen in the spine. No fracture. 



                                         IMPRESSION: No focal consolidation. 



                                         Signed: Faheem Floyd MD 



                                         Report Verified Date/Time:2018 16:37:25 



                                         Reading Location: [facility] Radiology Reading Room 



                                         Electronically signed by: FAHEEM FLOYD on 2018 04:37 





                                         PM 









                                        Procedure Note

 

                                        



Interface, External Ris In - 2018  4:39 PM CDT



FINAL REPORT

 

PATIENT ID:   [id number]

 

 

TECHNIQUE: Frontal and lateral chest radiographs dated 2018.

 

CLINICAL HISTORY: Chest pain

 

COMPARISON STUDY: None

 

FINDINGS:  

Atelectasis is seen in the left lung base. No focal consolidation. No 

pleural effusion or pneumothorax. Cardiomediastinal silhouette is 

normal in size. Small hiatal hernia is present. No pulmonary edema. 

Degenerative changes are seen in the spine. No fracture.

 

IMPRESSION: No focal consolidation.

 

Signed: Faheem Floyd MD

Report Verified Date/Time:  2018 16:37:25

 

Reading Location: [facility] Radiology Reading Room

    Electronically signed by: FAHEEM FLOYD on 2018 04:37 PM

 









   



                 Performing Organization     Address         City/[facility]     Phone Number

 

   



                                         [facility]   





* D-dimer, quantitative (2018  4:08 PM CDT)





   



                 Component       Value           Ref Range       Performed At

 

   



                 D-Dimer, Quant     0.24            <0.50 MG/L FEU     [facility]



                                         EMERGENCY [facility] LABORATORY













                                         Specimen

 





                                         Blood









 



                           Narrative                 Performed At

 

 



                           REGARDING D-DIMER RESULTS: Results of this D-Dimer test should always be     [facility], [place]. [place]



                           interpreted in conjunction with the patient's medical history, clinical     [facility] MEDICAL



                                         presentation and other findings. DVT clinical diagnosis should not be based on 

                                         [place], COMM[facility]ITY



                           the results of INNOVANCE D-Dimer alone.     EMERGENCY CENTER,



                                         [place] [facility]









   



                 Performing Organization     Address         City/[facility]     Phone Number

 

   



                 [facility]     [id number] [place] [address] [phone]



                                         [facility], [facility]   



                                         EMERGENCY [facility] LABORATORY   





* Rapid Myoglobin ([facility] Only) (2018  4:00 PM CDT)





   



                 Component       Value           Ref Range       Performed At

 

   



                 Rapid Myoglobin     56              <107 ng/mL      [facility]



                                         EMERGENCY [facility] LABORATORY













                                         Specimen

 





                                         Blood









   



                 Performing Organization     Address         City/[facility] Cent[facility]ode     Phone Number

 

   



                 [facility]     [id number] [place] [address] [phone]



                                         [facility] LABORATORY   





* Rapid Troponin I ([facility] Only) (2018  3:59 PM CDT)





   



                 Component       Value           Ref Range       Performed At

 

   



                 Rapid Troponin I     <0.05           <0.05 ng/mL     [facility]













                                         Specimen

 





                                         Blood









   



                 Performing Organization     Address         City/[facility] Centercode     Phone Number

 

   



                 [facility]     [address] [place] [address]     [phone]



                                         [facility] LABORATORY   





* Rapid CK-MB ([facility] Only) (2018  3:59 PM CDT)





   



                 Component       Value           Ref Range       Performed At

 

   



                 Rapid CKMB      <1.0            0.0 - 4.3 ng/mL     [facility]













                                         Specimen

 





                                         Blood









   



                 Performing Organization     Address         City/[facility] Cent[facility]ode     Phone Number

 

   



                 [facility]     [address] [place] [address]     [phone]



                                         [facility] LABORATORY   





* CBC with platelet count + automated diff (2018  3:59 PM CDT)





   



                 Component       Value           Ref Range       Performed At

 

   



                 WBC             6.2             4.0 - 10.0 10e3/L     [facility]

 

   



                 RBC             4.69            4.20 - 5.80 10e6/L     [facility]

 

   



                 Hemoglobin      14.1            13.0 - 16.8 g/dL     [facility]

 

   



                 Hematocrit      41.8            40.0 - 50.0 %     [facility]

 

   



                 MCV             89.2            82.0 - 98.0 fL     [facility]

 

   



                 MCH             30.1            27.0 - 33.0 pg     [facility]

 

   



                 MCHC            33.7            32.0 - 36.0 g/dL     [facility]

 

   



                 RDW             12.8            10.3 - 14.2 %     [facility]

 

   



                 Platelets       194             150 - 430 10e3/L     [facility]

 

   



                 MPV             7.2             6.5 - 10.5 fL     [facility]

 

   



                 % Neutros       52              %               [facility]

 

   



                 % Lymphs        34              %               [facility]

 

   



                 % Monos         10              %               [facility]

 

   



                 % Eos           3               %               [facility]

 

   



                 % Baso          1               %               [facility]

 

   



                 # Neutros       3.23            1.80 - 8.00 10e3/L     [facility]

 

   



                 # Lymphs        2.08            1.48 - 4.50 10e3/L     [facility]

 

   



                 # Monos         0.59            0.00 - 1.30 10e3/L     [facility]

 

   



                 # Eos           0.20            0.00 - 0.50 10e3/L     [facility]

 

   



                 # Baso          0.06            0.00 - 0.20 10e3/L     [facility]













                                         Specimen

 





                                         Blood









   



                 Performing Organization     Address         City/State/Zipcode     Phone Number

 

   



                 [facility] [place]     [address] [place] [address] [phone]



                                         [facility] LAB[facility]ATORY   





* PT/INR (2018  3:59 PM CDT)





   



                 Component       Value           Ref Range       Performed At

 

   



                 Protime         9.8             9.8 - 12.0 seconds     [facility] LAB[facility]AT[facility]

 

   



                 INR             0.9             <=5.9           [facility]













                                         Specimen

 





                                         Blood









 



                           Narrative                 Performed At

 

 



                           RECOMMENDED COUMADIN/WARFARIN INR THERAPY RANGES     [facility]



                           STANDARD DOSE: 2.0 - 3.0 Includes: PROPHYLAXIS for venous thrombosis,     [facility] MEDICAL



                                         systemic embolization; TREATMENT for venous thrombosis and/or pulmonary embolus.

                                         [facility]



                           HIGH RISK: Target INR is 2.5-3.5 for patients with mechanical heart valves.     EMERGENCY

 CENTER,



                                         [place] [facility]









   



                 Performing Organization     Address         City/[facility] Centercode     Phone Number

 

   



                 [address] [id number] [phone]



                                         [facility] LAB[facility]ATORY   





* B-type Natriuretic Factor (BNP) (2018  3:59 PM CDT)





   



                 Component       Value           Ref Range       Performed At

 

   



                 BNP             8               0 - 100 pg/mL     [facility]













                                         Specimen

 





                                         Blood









   



                 Performing Organization     Address         City/[facility] Centercode     Phone Number

 

   



                 [address] [address] [phone]



                                         [facility] LAB[facility]ATORY   





* Magnesium (2018  3:59 PM CDT)





   



                 Component       Value           Ref Range       Performed At

 

   



                 Magnesium       2.1             1.5 - 3.0 mg/dL     [facility]













                                         Specimen

 





                                         Blood









   



                 Performing Organization     Address         City/[facility]/Zipcode     Phone Number

 

   



                 [address] [address]     [phone]



                                         [facility] LAB[facility]ATORY   





* Creatine Kinase (CK) (2018  3:59 PM CDT)





   



                 Component       Value           Ref Range       Performed At

 

   



                 Total CK        28 (L)          40 - 250 U/L     [facility]













                                         Specimen

 





                                         Blood









   



                 Performing Organization     Address         City/[facility] Centercode     Phone Number

 

   



                 [address] [address] [phone]



                                         [facility]   





* Comprehensive metabolic panel (2018  3:59 PM CDT)





   



                 Component       Value           Ref Range       Performed At

 

   



                 Protein, Total     6.9             6.0 - 8.5 gm/dL     [facility],



                                         [place] [facility]

 

   



                 Albumin         4.2             3.5 - 5.0 g/dL     [facility],



                                         [place] [facility]

 

   



                 Alkaline Phosphatase     80              30 - 115 U/L     [facility]

 

   



                 Total Bilirubin     0.5             0.1 - 1.2 mg/dL     [facility],



                                         [place] [facility]

 

   



                 Sodium          144             135 - 148 meq/L     [facility]

 

   



                 Potassium       3.9             3.6 - 5.5 meq/L     [facility],



                                         [place] [facility]

 

   



                 Chloride        107 (H)         98 - 106 meq/L     [facility],



                                         [place] [facility]

 

   



                 CO2             24              24 - 32 meq/L     [facility],



                                         [place] [facility]

 

   



                 BUN             15              10 - 26 mg/dL     [facility]

 

   



                 Creatinine      0.97            0.50 - 1.20 mg/dL     [facility]

 

   



                 Glucose         85              70 - 110 mg/dL     [facility],



                                         [place] [facility]

 

   



                 Calcium         8.7             8.5 - 10.5 mg/dL     [facility],



                                         [place] [facility]

 

   



                 AST             24              5 - 40 U/L      [facility],



                                         [place] [facility]

 

   



                 ALT             30              5 - 50 U/L      [facility],



                                         [place] [facility]

 

   



                 EGFR            80Comment: ESTIMATED GFR IS     mL/min/1.73 sq m     [facility]



                           NOT AS ACCURATE AS CREATININE      [facility]



                           CLEARANCE IN [facility]



                           GLOMERULAR FILTRATION RATE.      EMERGENCY [place],



                           ESTIMATED GFR IS NOT      [place] [facility]



                                         APPLICABLE FOR DIALYSIS  



                                         PATIENTS.  













                                         Specimen

 





                                         Blood









   



                 Performing Organization     Address         City/State/Zipcode     Phone Number

 

   



                 [facility]     [address] [place] [address]     [phone]



                                         [facility], COMMUNITY   



                                         EMERGENCY CENTER,   



                                         [place] [facility]   





* ECG 12 lead (2018  3:48 PM CDT)









                                         Specimen

 











 



                           Narrative                 Performed At

 

 



                           Ventricular Rate 83 BPM     [facility]



                                         Atrial Rate 83 BPM 



                                         P-R Interval 184 ms 



                                         QRS Duration 78 ms 



                                         Q-T Interval 366 ms 



                                         QTC Calculation(Bazett) 430 ms 



                                         P Axis 29 degrees 



                                         R Axis -13 degrees 



                                         T Axis 8 degrees 



                                         Normal sinus rhythm 



                                         Normal ECG 



                                         No previous ECGs available 



                                         Confirmed by MD KOSTAS, IHAB ([id number]) on 2018 6:17:19 AM 









                                        Procedure Note

 

                                        



Interface, External Ris In - 2018  6:17 AM CDT



Ventricular Rate 83 BPM

Atrial Rate 83 BPM

P-R Interval 184 ms

QRS Duration 78 ms

Q-T Interval 366 ms

QTC Calculation(Bazett) 430 ms

P Axis 29 degrees

R Axis -13 degrees

T Axis 8 degrees



Normal sinus rhythm

Normal ECG

No previous ECGs available

Confirmed by MD KOSTAS, IHAB ([id number]) on 2018 6:17:19 AM









   



                 Performing Organization     Address         City/State/Zipcode     Phone Number

 

   



                                         [place]   





after 2018



Insurance







     



            Payer      Benefit     Subscriber ID     Type       Phone      Address



                                         Plan /    



                                         Group    

 

     



                 [facility]             xxxxxxxxxxxxx      [phone] 



                                         [facility]    









     



            Guarantor Name     Account     Relation to     Date of     Phone      Billing Address



                     Type                Patient             Birth  

 

     



            Rubin Anderson     Personal/F     Self       1960     [phone]     [id number]               (Home)              [place] [address]







Advance Directives





Patient has advance care planning documents, and code status on file. For more i
nformation, please contact:



[facility]



[address]



[place] [address] [phone]









                          Date Inactivated          Comments



                           Code Status               Date Activated  

 

                          2018  2:13 PM         



                           Full Code                 2018 10:05 PM  









  



                           This code status was determined by:     Patient 









                                                     

 

                          10/4/2017  7:22 PM         



                           Full Code                 10/4/2017 10:50 AM  









  



                           This code status was determined by:     Patient